# Patient Record
Sex: FEMALE | URBAN - METROPOLITAN AREA
[De-identification: names, ages, dates, MRNs, and addresses within clinical notes are randomized per-mention and may not be internally consistent; named-entity substitution may affect disease eponyms.]

---

## 2022-02-28 ENCOUNTER — OFFICE VISIT (OUTPATIENT)
Dept: URBAN - METROPOLITAN AREA CLINIC 6 | Facility: CLINIC | Age: 77
End: 2022-02-28
Payer: MEDICARE

## 2022-02-28 DIAGNOSIS — H26.493 OTHER SECONDARY CATARACT, BILATERAL: ICD-10-CM

## 2022-02-28 DIAGNOSIS — H35.371 PUCKERING OF MACULA, RIGHT EYE: ICD-10-CM

## 2022-02-28 DIAGNOSIS — H04.123 DRY EYE SYNDROME OF BILATERAL LACRIMAL GLANDS: ICD-10-CM

## 2022-02-28 DIAGNOSIS — E11.9 TYPE 2 DIABETES MELLITUS WITHOUT COMPLICATIONS: Primary | ICD-10-CM

## 2022-02-28 DIAGNOSIS — H43.393 OTHER VITREOUS OPACITIES, BILATERAL: ICD-10-CM

## 2022-02-28 DIAGNOSIS — H11.153 PINGUECULA, BILATERAL: ICD-10-CM

## 2022-02-28 DIAGNOSIS — H44.112 PANUVEITIS, LEFT EYE: ICD-10-CM

## 2022-02-28 PROCEDURE — 92014 COMPRE OPH EXAM EST PT 1/>: CPT | Performed by: OPTOMETRIST

## 2022-02-28 RX ORDER — NEOMYCIN SULFATE, POLYMYXIN B SULFATE AND DEXAMETHASONE 3.5; 10000; 1 MG/G; [USP'U]/G; MG/G
OINTMENT OPHTHALMIC
Qty: 3.5 | Refills: 0 | Status: ACTIVE
Start: 2022-02-28

## 2022-02-28 ASSESSMENT — INTRAOCULAR PRESSURE
OD: 12
OS: 9

## 2022-02-28 NOTE — IMPRESSION/PLAN
Impression: Puckering of macula, right eye: H35.371. Plan: Epiretinal membrane Right eye- Mild with no CME - Plan to observe for now without intervention.

## 2022-02-28 NOTE — IMPRESSION/PLAN
Impression: Chalazion right upper eyelid: H00.11. Plan: Chalazion - start warm compresses, massages and Maxitrol ointment QID. If does not improve, will consider either intralesional Kenalog injection or surgical excision in the office.

## 2022-02-28 NOTE — IMPRESSION/PLAN
Impression: Type 2 diabetes mellitus without complications: J92.8. Plan: Diabetes Mellitus Type II without signs of diabetic retinopathy either eye - Discussed the pathophysiology of diabetes and its effect on the eye. Stressed the importance of strong glucose control. Advised of importance of at least annual dilated examinations, and to contact us immediately for any problems or concerns.

## 2022-02-28 NOTE — IMPRESSION/PLAN
Impression: Panuveitis, left eye: H44.112. 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 Using pred (shake well) QID as Rx by Dr. Linden Metcalf. PT hasn't seen him since 8/21-referred back to him. Asymptomatic today.

## 2022-03-21 ENCOUNTER — OFFICE VISIT (OUTPATIENT)
Dept: URBAN - METROPOLITAN AREA CLINIC 6 | Facility: CLINIC | Age: 77
End: 2022-03-21
Payer: MEDICARE

## 2022-03-21 DIAGNOSIS — H00.11 CHALAZION RIGHT UPPER EYELID: ICD-10-CM

## 2022-03-21 PROCEDURE — 92012 INTRM OPH EXAM EST PATIENT: CPT | Performed by: OPTOMETRIST

## 2022-03-21 ASSESSMENT — INTRAOCULAR PRESSURE
OS: 11
OD: 9

## 2022-03-21 NOTE — IMPRESSION/PLAN
Impression: Panuveitis, left eye: H44.112. Plan: has an appt with Dr. Pedro Agrawal on April 4th-CCRx.

## 2022-03-21 NOTE — IMPRESSION/PLAN
Impression: Chalazion right upper eyelid: H00.11. Plan: continue maxitrol juarez TID and lid hygiene. Offered procedure but pt declined.

## 2022-04-29 ENCOUNTER — OFFICE VISIT (OUTPATIENT)
Dept: URBAN - METROPOLITAN AREA CLINIC 6 | Facility: CLINIC | Age: 77
End: 2022-04-29
Payer: MEDICARE

## 2022-04-29 DIAGNOSIS — H44.112 PANUVEITIS, LEFT EYE: Primary | ICD-10-CM

## 2022-04-29 DIAGNOSIS — H00.11 CHALAZION RIGHT UPPER EYELID: ICD-10-CM

## 2022-04-29 PROCEDURE — 92012 INTRM OPH EXAM EST PATIENT: CPT | Performed by: OPTOMETRIST

## 2022-04-29 ASSESSMENT — INTRAOCULAR PRESSURE
OD: 8
OS: 14

## 2022-04-29 NOTE — IMPRESSION/PLAN
Impression: Panuveitis, left eye: H44.112. Plan: has an appt with Dr. Marshall Sparks on June 10th -CCRx.

## 2022-04-29 NOTE — IMPRESSION/PLAN
Impression: Chalazion right upper eyelid: H00.11. Plan: Resolved. D/C maxitrol juarez TID and continue lid hygiene.

## 2023-02-20 ENCOUNTER — OFFICE VISIT (OUTPATIENT)
Dept: URBAN - METROPOLITAN AREA CLINIC 6 | Facility: CLINIC | Age: 78
End: 2023-02-20
Payer: COMMERCIAL

## 2023-02-20 DIAGNOSIS — E11.9 TYPE 2 DIABETES MELLITUS WITHOUT COMPLICATIONS: Primary | ICD-10-CM

## 2023-02-20 DIAGNOSIS — H44.112 PANUVEITIS, LEFT EYE: ICD-10-CM

## 2023-02-20 DIAGNOSIS — H35.373 PUCKERING OF MACULA, BILATERAL: ICD-10-CM

## 2023-02-20 DIAGNOSIS — H04.123 DRY EYE SYNDROME OF BILATERAL LACRIMAL GLANDS: ICD-10-CM

## 2023-02-20 DIAGNOSIS — H43.393 OTHER VITREOUS OPACITIES, BILATERAL: ICD-10-CM

## 2023-02-20 PROCEDURE — 92014 COMPRE OPH EXAM EST PT 1/>: CPT | Performed by: OPTOMETRIST

## 2023-02-20 ASSESSMENT — INTRAOCULAR PRESSURE
OS: 18
OD: 6

## 2023-02-20 NOTE — IMPRESSION/PLAN
Impression: Puckering of macula, bilateral: H35.373. Plan: Epiretinal membrane both eyes -  Plan to observe for now without intervention.

## 2023-02-20 NOTE — IMPRESSION/PLAN
Impression: Panuveitis, left eye: H44.112. 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 Using pred (shake well) BID as Rx by Dr. Jayme Millan. PT hasn't seen him since 12/22. CCrx. Next appointment March 2023. Asymptomatic today.

## 2023-02-20 NOTE — IMPRESSION/PLAN
Impression: Type 2 diabetes mellitus without complications: U42.8. Plan: Diabetes Mellitus Type II without signs of diabetic retinopathy either eye - Discussed the pathophysiology of diabetes and its effect on the eye. Stressed the importance of strong glucose control. Advised of importance of at least annual dilated examinations, and to contact us immediately for any problems or concerns.